# Patient Record
Sex: MALE | Race: WHITE | NOT HISPANIC OR LATINO | Employment: FULL TIME | URBAN - METROPOLITAN AREA
[De-identification: names, ages, dates, MRNs, and addresses within clinical notes are randomized per-mention and may not be internally consistent; named-entity substitution may affect disease eponyms.]

---

## 2021-09-01 ENCOUNTER — OFFICE VISIT (OUTPATIENT)
Dept: VASCULAR SURGERY | Facility: CLINIC | Age: 51
End: 2021-09-01
Payer: COMMERCIAL

## 2021-09-01 DIAGNOSIS — I83.893 SYMPTOMATIC VARICOSE VEINS OF BOTH LOWER EXTREMITIES: Primary | ICD-10-CM

## 2021-09-01 PROCEDURE — 99243 OFF/OP CNSLTJ NEW/EST LOW 30: CPT | Performed by: SPECIALIST

## 2021-09-01 NOTE — LETTER
9/1/2021         RE: Jin Voss  [address] [facility] [address]        Dear Colleague,    Thank you for referring your patient, Jin Voss, to the [facility] [place]. Please see a copy of my visit note below.    Consult requested by Dr. Martinez    Reason for consultation -bilateral lower extremity varicose veins    HPI:  Patient is a 50-year-old white male with a 4 to 5-year history of bilateral lower extremity varicose veins.  They appeared and then slowly worsened over that time.  He denies any right leg symptoms but occasionally report some intermittent left leg symptoms.  He has not had an ultrasound nor worn compression socks.  There is a history of trauma to his left leg while playing soccer in [place].  He denies any DVT, superficial phlebitis or nonhealing wounds.  He now presents for evaluation of his bilateral lower extremity varicose veins.    No past medical history on file.    No past surgical history on file.    No current outpatient medications on file.     No current facility-administered medications for this visit.        Allergies   Allergen Reactions     Penicillins Hives     PN: LW Reaction: Rash, Generalized         Social History     Socioeconomic History     Marital status:      Spouse name: Not on file     Number of children: Not on file     Years of education: Not on file     Highest education level: Not on file   Occupational History     Not on file   Tobacco Use     Smoking status: Not on file   Substance and Sexual Activity     Alcohol use: Not on file     Drug use: Not on file     Sexual activity: Not on file   Other Topics Concern     Not on file   Social History Narrative     Not on file     Social Determinants of Health     Financial Resource Strain:      Difficulty of Paying Living Expenses:    Food Insecurity:      Worried About Running Out of Food in the Last Year:      Ran Out of Food in the Last Year:    Transportation Needs:       Lack of Transportation (Medical):      Lack of Transportation (Non-Medical):    Physical Activity:      Days of Exercise per Week:      Minutes of Exercise per Session:    Stress:      Feeling of Stress :    Social Connections:      Frequency of Communication with Friends and Family:      Frequency of Social Gatherings with Friends and Family:      Attends Taoist Services:      Active Member of Clubs or Organizations:      Attends Club or Organization Meetings:      Marital Status:    Intimate Partner Violence:      Fear of Current or Ex-Partner:      Emotionally Abused:      Physically Abused:      Sexually Abused:      No family history on file.     ROS: 10 point ROS neg other than the symptoms noted above in the HPI.    PE:  B/P: Data Unavailable, T: Data Unavailable, P: Data Unavailable, R: Data Unavailable  General: well developed, well nourished thin WM who appears hia stated age  HEENT: NC/AT, EOMI, (-)icterus, (-)injection  Neck: Supple, No JVD  Chest: CTA  Heart: S1, S2, (-)m/r/g  Abd: Soft, non tender, non distended, non tender, no masses  Ext; Warm, no edema, space bilateral varicose veins left greater than right.  No stasis changes.  Psych: AAOx3  Neuro: No focal deficits      Impression/plan:  This 50 years old bilateral varicose veins.  The right leg is essentially asymptomatic and the left is minimally symptomatic.  He is a CEAP 2 bilaterally.  I did discuss him that his veins are mostly cosmetic from what he is describing.  He expressed understanding.  He would like to consider a cosmetic procedure as well and also know what is going on with his veins.  So after discussion the patient the plan at this time is to obtain an ultrasound for competency and start him in knee-high compression socks.  He will follow-up with me for video visit after his ultrasound.    Adrian Jorgensen MD, FACS      Again, thank you for allowing me to participate in the care of your patient.        Sincerely,        Adrian  MD [place]

## 2021-09-01 NOTE — PROGRESS NOTES
Consult requested by Dr. Martinez    Reason for consultation -bilateral lower extremity varicose veins    HPI:  Patient is a 50-year-old white male with a 4 to 5-year history of bilateral lower extremity varicose veins.  They appeared and then slowly worsened over that time.  He denies any right leg symptoms but occasionally report some intermittent left leg symptoms.  He has not had an ultrasound nor worn compression socks.  There is a history of trauma to his left leg while playing soccer in [place].  He denies any DVT, superficial phlebitis or nonhealing wounds.  He now presents for evaluation of his bilateral lower extremity varicose veins.    No past medical history on file.    No past surgical history on file.    No current outpatient medications on file.     No current facility-administered medications for this visit.        Allergies   Allergen Reactions     Penicillins Hives     PN: LW Reaction: Rash, Generalized         Social History     Socioeconomic History     Marital status:      Spouse name: Not on file     Number of children: Not on file     Years of education: Not on file     Highest education level: Not on file   Occupational History     Not on file   Tobacco Use     Smoking status: Not on file   Substance and Sexual Activity     Alcohol use: Not on file     Drug use: Not on file     Sexual activity: Not on file   Other Topics Concern     Not on file   Social History Narrative     Not on file     Social Determinants of Health     Financial Resource Strain:      Difficulty of Paying Living Expenses:    Food Insecurity:      Worried About Running Out of Food in the Last Year:      Ran Out of Food in the Last Year:    Transportation Needs:      Lack of Transportation (Medical):      Lack of Transportation (Non-Medical):    Physical Activity:      Days of Exercise per Week:      Minutes of Exercise per Session:    Stress:      Feeling of Stress :    Social Connections:      Frequency of  Communication with Friends and Family:      Frequency of Social Gatherings with Friends and Family:      Attends Pentecostal Services:      Active Member of Clubs or Organizations:      Attends Club or Organization Meetings:      Marital Status:    Intimate Partner Violence:      Fear of Current or Ex-Partner:      Emotionally Abused:      Physically Abused:      Sexually Abused:      No family history on file.     ROS: 10 point ROS neg other than the symptoms noted above in the HPI.    PE:  B/P: Data Unavailable, T: Data Unavailable, P: Data Unavailable, R: Data Unavailable  General: well developed, well nourished thin WM who appears hia stated age  HEENT: NC/AT, EOMI, (-)icterus, (-)injection  Neck: Supple, No JVD  Chest: CTA  Heart: S1, S2, (-)m/r/g  Abd: Soft, non tender, non distended, non tender, no masses  Ext; Warm, no edema, space bilateral varicose veins left greater than right.  No stasis changes.  Psych: AAOx3  Neuro: No focal deficits      Impression/plan:  This 50 years old bilateral varicose veins.  The right leg is essentially asymptomatic and the left is minimally symptomatic.  He is a CEAP 2 bilaterally.  I did discuss him that his veins are mostly cosmetic from what he is describing.  He expressed understanding.  He would like to consider a cosmetic procedure as well and also know what is going on with his veins.  So after discussion the patient the plan at this time is to obtain an ultrasound for competency and start him in knee-high compression socks.  He will follow-up with me for video visit after his ultrasound.    Adrian Jorgensen MD, FACS

## 2021-09-16 ENCOUNTER — ANCILLARY PROCEDURE (OUTPATIENT)
Dept: ULTRASOUND IMAGING | Facility: CLINIC | Age: 51
End: 2021-09-16
Attending: SPECIALIST
Payer: COMMERCIAL

## 2021-09-16 DIAGNOSIS — I83.893 SYMPTOMATIC VARICOSE VEINS OF BOTH LOWER EXTREMITIES: ICD-10-CM

## 2021-09-16 PROCEDURE — 93970 EXTREMITY STUDY: CPT | Performed by: SPECIALIST

## 2021-09-21 ENCOUNTER — VIRTUAL VISIT (OUTPATIENT)
Dept: VASCULAR SURGERY | Facility: CLINIC | Age: 51
End: 2021-09-21
Attending: SPECIALIST
Payer: COMMERCIAL

## 2021-09-21 DIAGNOSIS — I83.91 ASYMPTOMATIC VARICOSE VEINS OF RIGHT LOWER EXTREMITY: ICD-10-CM

## 2021-09-21 DIAGNOSIS — I83.812 VARICOSE VEINS OF LEFT LOWER EXTREMITY WITH PAIN: Primary | ICD-10-CM

## 2021-09-21 PROCEDURE — 99213 OFFICE O/P EST LOW 20 MIN: CPT | Mod: 95 | Performed by: SPECIALIST

## 2021-09-21 NOTE — LETTER
2021         RE: Jin Voss  [address] [facility] [address]        Dear Colleague,    Thank you for referring your patient, Jin Voss, to the [facility] [place]. Please see a copy of my visit note below.    Jin is a 51 year old who is being evaluated via a billable video visit.      How would you like to obtain your AVS? MyChart  If the video visit is dropped, the invitation should be resent by: Text to cell phone: [phone]  Will anyone else be joining your video visit? No    Video Start Time: 8:45 AM    F/U lower ext US       Subjective:  Patient is a 50-year-old white male with a 4 to 5-year history of bilateral lower extremity varicose veins.  They appeared and then slowly worsened over that time.  He denies any right leg symptoms but occasionally report some intermittent left leg symptoms.  He has not had an ultrasound nor worn compression socks.  There is a history of trauma to his left leg while playing soccer in [place].  He denies any DVT, superficial phlebitis or nonhealing wounds.      He had his US for which he now follows up.    Objective:  No vitals - video visit  Ext; Warm, no edema, space bilateral varicose veins left greater than right.  No stasis changes.    US -   Name:  Jin Voss                                                Patient ID: [id number]  Date: 2021                                         : 1970  Sex: male                                                                    Examined by: JANNETTE Hough RVT  Age:  51 year old                                                         Reading MD: HALIE Jorgensen     INDICATION:  Patient is referred for symptomatic varicose veins of bilateral lower extremities.      EXAM TYPE  BILATERAL LOWER EXTREMITY VENOUS DUPLEX FOR VENOUS INSUFFICIENCY  TECHNICAL SUMMARY     A duplex ultrasound study using color flow was performed, to evaluate the bilateral lower extremity veins for valvular  incompetence with the patient in a steep reversed trendelenberg.      RIGHT:     The deep veins demonstrate phasic flow, compress and respond to augmentations.  There is no DVT.  The common femoral and proximal femoral veins are incompetent and free of thrombus. The remaining deep veins are competent and free of thrombus.      The GSV demonstrates phasic flow, compresses and responds to augmentations from the saphenofemoral junction to the ankle with no evidence of thrombus. The great saphenous vein measures 3.3 mm at the saphenofemoral junction, 2.3 mm at the proximal thigh and 2.3 mm at the knee. The GSV is incompetent from Proximal Calf to Mid Calf, with the greatest reflux time of 2574 milliseconds.       The AASV is competent ( 2.9 mm) draining into the saphenofemoral junction.       The Giacomini vein is absent.     The SSV demonstrates phasic flow, compresses and responds to augmentations from the popliteal space to the ankle.  No thrombus is seen. The saphenopopliteal junction is competent  (3.0 mm). The SSV is incompetent at the Distal Calf with a reflux time of 528 milliseconds.       Perforators: there is no evidence of incompetent  veins at any level.         LEFT:     The deep veins demonstrate phasic flow, compress and respond to augmentations.  There is no DVT.  The common femoral, femoral, and popliteal veins are incompetent and free of thrombus.      The GSV demonstrates phasic flow, compresses and responds to augmentations from the saphenofemoral junction to the ankle with no evidence of thrombus. The great saphenous vein measures 5.5 mm at the saphenofemoral junction, 3.8 mm at the proximal thigh, and 3.5 mm at the knee. The GSV is incompetent from SFJ to Knee and again at the ankle, with the greatest reflux time of 6731 milliseconds.  The GSV gives rise to a varicose branch measuring 4.0 mm off the  Knee that courses Posteromedial with a reflux time of 5444 milliseconds.       The AASV  is competent ( 2.1 mm) draining into the saphenofemoral junction.       The Giacomini vein is absent.     The SSV demonstrates phasic flow, compresses and responds to augmentations from the popliteal space to the ankle.  No reflux or thrombus is seen. The saphenopopliteal junction is competent  ( 1.5 mm).     Perforators: there is no evidence of incompetent  veins at any level.         FINAL SUMMARY:  1.         No evidence of bilateral lower extremity deep vein thrombus.  2.         Right common femoral and proximal femoral vein incompetence.   3.         Left common femoral, femoral, and popliteal vein incompetence  4.         Right great saphenous vein incompetence.  5.         Right small saphenous vein incompetence.  6.         Left great saphenous vein incompetence.  7.         Left varicose vein incompetence.   8.         The time of incompetence is greater than 500 milliseconds in perforators and superficial veins and greater than 1000 milliseconds in deep veins.         Adrian Jorgensen MD, FACS      Assessment/plan:  This 50 years old bilateral varicose veins.  The right leg is essentially asymptomatic and the left is becoming more symptomatic.  He is a CEAP 2 bilaterally.  His right leg is asymptomatic but his left leg is noticed to becoming more symptomatic.  His ultrasound not reveal a any significant right lower extremity reflux but there is significant GSV reflux on the left.  I discussed these findings with the patient expressed understanding.  His left leg is becoming more symptomatic he feels.  After discussion with the patient plan at this time is to have him start his compression socks and follow-up with me in 3 months.  Should he be symptomatic despite compression therapy and left the plan be for a left GSV RF ablation with medically necessary stab phlebectomy.  He will follow-up with me in 3 months.      Adrian Jorgensen MD, FACS          {  Video-Visit Details    Type of service:   Video Visit    Video End Time:9:19 AM    Originating Location (pt. Location): Home    Distant Location (provider location):  [facility]     Platform used for Video Visit: [employer]      Again, thank you for allowing me to participate in the care of your patient.        Sincerely,        Adrian Jorgensen MD

## 2021-09-21 NOTE — PROGRESS NOTES
Jin is a 51 year old who is being evaluated via a billable video visit.      How would you like to obtain your AVS? MyChart  If the video visit is dropped, the invitation should be resent by: Text to cell phone: [phone]  Will anyone else be joining your video visit? No    Video Start Time: 8:45 AM    F/U lower ext US       Subjective:  Patient is a 50-year-old white male with a 4 to 5-year history of bilateral lower extremity varicose veins.  They appeared and then slowly worsened over that time.  He denies any right leg symptoms but occasionally report some intermittent left leg symptoms.  He has not had an ultrasound nor worn compression socks.  There is a history of trauma to his left leg while playing soccer in [place].  He denies any DVT, superficial phlebitis or nonhealing wounds.      He had his US for which he now follows up.    Objective:  No vitals - video visit  Ext; Warm, no edema, space bilateral varicose veins left greater than right.  No stasis changes.    US -   Name:  Jin Voss                                                Patient ID: [id number]  Date: 2021                                         : 1970  Sex: male                                                                    Examined by: JANNETTE Hough RVT  Age:  51 year old                                                         Reading MD: HALIE Jorgensen     INDICATION:  Patient is referred for symptomatic varicose veins of bilateral lower extremities.      EXAM TYPE  BILATERAL LOWER EXTREMITY VENOUS DUPLEX FOR VENOUS INSUFFICIENCY  TECHNICAL SUMMARY     A duplex ultrasound study using color flow was performed, to evaluate the bilateral lower extremity veins for valvular incompetence with the patient in a steep reversed trendelenberg.      RIGHT:     The deep veins demonstrate phasic flow, compress and respond to augmentations.  There is no DVT.  The common femoral and proximal femoral veins are incompetent and free of  thrombus. The remaining deep veins are competent and free of thrombus.      The GSV demonstrates phasic flow, compresses and responds to augmentations from the saphenofemoral junction to the ankle with no evidence of thrombus. The great saphenous vein measures 3.3 mm at the saphenofemoral junction, 2.3 mm at the proximal thigh and 2.3 mm at the knee. The GSV is incompetent from Proximal Calf to Mid Calf, with the greatest reflux time of 2574 milliseconds.       The AASV is competent ( 2.9 mm) draining into the saphenofemoral junction.       The Giacomini vein is absent.     The SSV demonstrates phasic flow, compresses and responds to augmentations from the popliteal space to the ankle.  No thrombus is seen. The saphenopopliteal junction is competent  (3.0 mm). The SSV is incompetent at the Distal Calf with a reflux time of 528 milliseconds.       Perforators: there is no evidence of incompetent  veins at any level.         LEFT:     The deep veins demonstrate phasic flow, compress and respond to augmentations.  There is no DVT.  The common femoral, femoral, and popliteal veins are incompetent and free of thrombus.      The GSV demonstrates phasic flow, compresses and responds to augmentations from the saphenofemoral junction to the ankle with no evidence of thrombus. The great saphenous vein measures 5.5 mm at the saphenofemoral junction, 3.8 mm at the proximal thigh, and 3.5 mm at the knee. The GSV is incompetent from SFJ to Knee and again at the ankle, with the greatest reflux time of 6731 milliseconds.  The GSV gives rise to a varicose branch measuring 4.0 mm off the  Knee that courses Posteromedial with a reflux time of 5444 milliseconds.       The AASV is competent ( 2.1 mm) draining into the saphenofemoral junction.       The Giacomini vein is absent.     The SSV demonstrates phasic flow, compresses and responds to augmentations from the popliteal space to the ankle.  No reflux or thrombus is seen.  The saphenopopliteal junction is competent  ( 1.5 mm).     Perforators: there is no evidence of incompetent  veins at any level.         FINAL SUMMARY:  1.         No evidence of bilateral lower extremity deep vein thrombus.  2.         Right common femoral and proximal femoral vein incompetence.   3.         Left common femoral, femoral, and popliteal vein incompetence  4.         Right great saphenous vein incompetence.  5.         Right small saphenous vein incompetence.  6.         Left great saphenous vein incompetence.  7.         Left varicose vein incompetence.   8.         The time of incompetence is greater than 500 milliseconds in perforators and superficial veins and greater than 1000 milliseconds in deep veins.         Adrian Jorgensen MD, FACS      Assessment/plan:  This 50 years old bilateral varicose veins.  The right leg is essentially asymptomatic and the left is becoming more symptomatic.  He is a CEAP 2 bilaterally.  His right leg is asymptomatic but his left leg is noticed to becoming more symptomatic.  His ultrasound not reveal a any significant right lower extremity reflux but there is significant GSV reflux on the left.  I discussed these findings with the patient expressed understanding.  His left leg is becoming more symptomatic he feels.  After discussion with the patient plan at this time is to have him start his compression socks and follow-up with me in 3 months.  Should he be symptomatic despite compression therapy and left the plan be for a left GSV RF ablation with medically necessary stab phlebectomy.  He will follow-up with me in 3 months.      Adrian Jorgensen MD, FACS          {  Video-Visit Details    Type of service:  Video Visit    Video End Time:9:19 AM    Originating Location (pt. Location): Home    Distant Location (provider location):  [facility] CL[facility] [place]     Platform used for Video Visit: SQLstream

## 2021-12-22 ENCOUNTER — VIRTUAL VISIT (OUTPATIENT)
Dept: VASCULAR SURGERY | Facility: CLINIC | Age: 51
End: 2021-12-22
Payer: COMMERCIAL

## 2021-12-22 DIAGNOSIS — I83.812 VARICOSE VEINS OF LEFT LOWER EXTREMITY WITH PAIN: Primary | ICD-10-CM

## 2021-12-22 DIAGNOSIS — I83.91 ASYMPTOMATIC VARICOSE VEINS OF RIGHT LOWER EXTREMITY: ICD-10-CM

## 2021-12-22 PROCEDURE — 99213 OFFICE O/P EST LOW 20 MIN: CPT | Mod: 95 | Performed by: SPECIALIST

## 2021-12-22 NOTE — PROGRESS NOTES
Jin is a 51 year old who is being evaluated via a billable video visit.      How would you like to obtain your AVS? MyChart  If the video visit is dropped, the invitation should be resent by: Text to cell phone: [phone]  Will anyone else be joining your video visit? No    Video Start Time: 3:26 PM    Video-Visit Details    F/U of compression therapy.    Subjective:  Patient is a 51-year-old white male with a 4 to 5-year history of bilateral lower extremity varicose veins.  They appeared and then slowly worsened over that time.  He denies any right leg symptoms but occasionally report some intermittent left leg symptoms.  He has not had an ultrasound nor worn compression socks.  There is a history of trauma to his left leg while playing soccer in [place].  He denies any DVT, superficial phlebitis or nonhealing wounds.      He has been wearing compression socks since he was last seen in September.  His left leg continues to be symptomatic despite compression therapy.      Objective:  No vitals - video visit  Ext; Warm, no edema, space bilateral varicose veins left greater than right.  No stasis changes.    US -   Name:  Jin Voss                                                Patient ID: [id number]  Date: 2021                                         : 1970  Sex: male                                                                    Examined by: JANNETTE Hough RVT  Age:  51 year old                                                         Reading MD: HALIE Jorgensen     INDICATION:  Patient is referred for symptomatic varicose veins of bilateral lower extremities.      EXAM TYPE  BILATERAL LOWER EXTREMITY VENOUS DUPLEX FOR VENOUS INSUFFICIENCY  TECHNICAL SUMMARY     A duplex ultrasound study using color flow was performed, to evaluate the bilateral lower extremity veins for valvular incompetence with the patient in a steep reversed trendelenberg.      RIGHT:     The deep veins demonstrate phasic  flow, compress and respond to augmentations.  There is no DVT.  The common femoral and proximal femoral veins are incompetent and free of thrombus. The remaining deep veins are competent and free of thrombus.      The GSV demonstrates phasic flow, compresses and responds to augmentations from the saphenofemoral junction to the ankle with no evidence of thrombus. The great saphenous vein measures 3.3 mm at the saphenofemoral junction, 2.3 mm at the proximal thigh and 2.3 mm at the knee. The GSV is incompetent from Proximal Calf to Mid Calf, with the greatest reflux time of 2574 milliseconds.       The AASV is competent ( 2.9 mm) draining into the saphenofemoral junction.       The Giacomini vein is absent.     The SSV demonstrates phasic flow, compresses and responds to augmentations from the popliteal space to the ankle.  No thrombus is seen. The saphenopopliteal junction is competent  (3.0 mm). The SSV is incompetent at the Distal Calf with a reflux time of 528 milliseconds.       Perforators: there is no evidence of incompetent  veins at any level.         LEFT:     The deep veins demonstrate phasic flow, compress and respond to augmentations.  There is no DVT.  The common femoral, femoral, and popliteal veins are incompetent and free of thrombus.      The GSV demonstrates phasic flow, compresses and responds to augmentations from the saphenofemoral junction to the ankle with no evidence of thrombus. The great saphenous vein measures 5.5 mm at the saphenofemoral junction, 3.8 mm at the proximal thigh, and 3.5 mm at the knee. The GSV is incompetent from SFJ to Knee and again at the ankle, with the greatest reflux time of 6731 milliseconds.  The GSV gives rise to a varicose branch measuring 4.0 mm off the  Knee that courses Posteromedial with a reflux time of 5444 milliseconds.       The AASV is competent ( 2.1 mm) draining into the saphenofemoral junction.       The Giacomini vein is absent.     The SSV  demonstrates phasic flow, compresses and responds to augmentations from the popliteal space to the ankle.  No reflux or thrombus is seen. The saphenopopliteal junction is competent  ( 1.5 mm).     Perforators: there is no evidence of incompetent  veins at any level.         FINAL SUMMARY:  1.         No evidence of bilateral lower extremity deep vein thrombus.  2.         Right common femoral and proximal femoral vein incompetence.   3.         Left common femoral, femoral, and popliteal vein incompetence  4.         Right great saphenous vein incompetence.  5.         Right small saphenous vein incompetence.  6.         Left great saphenous vein incompetence.  7.         Left varicose vein incompetence.   8.         The time of incompetence is greater than 500 milliseconds in perforators and superficial veins and greater than 1000 milliseconds in deep veins.         Adrian Jorgensen MD, FACS      Assessment/plan:  This 51 years old bilateral varicose veins.  The right leg is essentially asymptomatic and the left is symptomatic despite compression therapy.  He is a CEAP 2 bilaterally.  His right leg is asymptomatic   His ultrasound not reveal a any significant right lower extremity reflux but there is significant GSV reflux on the left.  As he is symptomatic despite compression therapy is a candidate for a left GSV RF ablation with medically necessary stab phlebectomies.   The procedure, risks, benefits, and alternatives were discussed and the patient agrees to proceed.  He will be scheduled once insurance approves obtained.    Adrian Jorgensen MD, FACS              Video End Time:3:43 PM    Originating Location (pt. Location): Home    Distant Location (provider location):  [facility]     Platform used for Video Visit: [id number]

## 2021-12-22 NOTE — LETTER
2021         RE: Jin Voss  [address] [address]        Dear Colleague,    Thank you for referring your patient, Jin Voss, to the [facility] CLINIC [place]. Please see a copy of my visit note below.    Jin is a 51 year old who is being evaluated via a billable video visit.      How would you like to obtain your AVS? MyChart  If the video visit is dropped, the invitation should be resent by: Text to cell phone: [phone]  Will anyone else be joining your video visit? No    Video Start Time: 3:26 PM    Video-Visit Details    F/U of compression therapy.    Subjective:  Patient is a 51-year-old white male with a 4 to 5-year history of bilateral lower extremity varicose veins.  They appeared and then slowly worsened over that time.  He denies any right leg symptoms but occasionally report some intermittent left leg symptoms.  He has not had an ultrasound nor worn compression socks.  There is a history of trauma to his left leg while playing soccer in [place].  He denies any DVT, superficial phlebitis or nonhealing wounds.      He has been wearing compression socks since he was last seen in September.  His left leg continues to be symptomatic despite compression therapy.      Objective:  No vitals - video visit  Ext; Warm, no edema, space bilateral varicose veins left greater than right.  No stasis changes.    US -   Name:  Jin Voss                                                Patient ID: [id number]  Date: 2021                                         : 1970  Sex: male                                                                    Examined by: JANNETTE Hough RVT  Age:  51 year old                                                         Reading MD: HALIE Jorgensen     INDICATION:  Patient is referred for symptomatic varicose veins of bilateral lower extremities.      EXAM TYPE  BILATERAL LOWER EXTREMITY VENOUS DUPLEX FOR VENOUS  INSUFFICIENCY  TECHNICAL SUMMARY     A duplex ultrasound study using color flow was performed, to evaluate the bilateral lower extremity veins for valvular incompetence with the patient in a steep reversed trendelenberg.      RIGHT:     The deep veins demonstrate phasic flow, compress and respond to augmentations.  There is no DVT.  The common femoral and proximal femoral veins are incompetent and free of thrombus. The remaining deep veins are competent and free of thrombus.      The GSV demonstrates phasic flow, compresses and responds to augmentations from the saphenofemoral junction to the ankle with no evidence of thrombus. The great saphenous vein measures 3.3 mm at the saphenofemoral junction, 2.3 mm at the proximal thigh and 2.3 mm at the knee. The GSV is incompetent from Proximal Calf to Mid Calf, with the greatest reflux time of 2574 milliseconds.       The AASV is competent ( 2.9 mm) draining into the saphenofemoral junction.       The Giacomini vein is absent.     The SSV demonstrates phasic flow, compresses and responds to augmentations from the popliteal space to the ankle.  No thrombus is seen. The saphenopopliteal junction is competent  (3.0 mm). The SSV is incompetent at the Distal Calf with a reflux time of 528 milliseconds.       Perforators: there is no evidence of incompetent  veins at any level.         LEFT:     The deep veins demonstrate phasic flow, compress and respond to augmentations.  There is no DVT.  The common femoral, femoral, and popliteal veins are incompetent and free of thrombus.      The GSV demonstrates phasic flow, compresses and responds to augmentations from the saphenofemoral junction to the ankle with no evidence of thrombus. The great saphenous vein measures 5.5 mm at the saphenofemoral junction, 3.8 mm at the proximal thigh, and 3.5 mm at the knee. The GSV is incompetent from SFJ to Knee and again at the ankle, with the greatest reflux time of 6731 milliseconds.   The GSV gives rise to a varicose branch measuring 4.0 mm off the  Knee that courses Posteromedial with a reflux time of 5444 milliseconds.       The AASV is competent ( 2.1 mm) draining into the saphenofemoral junction.       The Giacomini vein is absent.     The SSV demonstrates phasic flow, compresses and responds to augmentations from the popliteal space to the ankle.  No reflux or thrombus is seen. The saphenopopliteal junction is competent  ( 1.5 mm).     Perforators: there is no evidence of incompetent  veins at any level.         FINAL SUMMARY:  1.         No evidence of bilateral lower extremity deep vein thrombus.  2.         Right common femoral and proximal femoral vein incompetence.   3.         Left common femoral, femoral, and popliteal vein incompetence  4.         Right great saphenous vein incompetence.  5.         Right small saphenous vein incompetence.  6.         Left great saphenous vein incompetence.  7.         Left varicose vein incompetence.   8.         The time of incompetence is greater than 500 milliseconds in perforators and superficial veins and greater than 1000 milliseconds in deep veins.         Adrian Jorgensen MD, FACS      Assessment/plan:  This 51 years old bilateral varicose veins.  The right leg is essentially asymptomatic and the left is symptomatic despite compression therapy.  He is a CEAP 2 bilaterally.  His right leg is asymptomatic   His ultrasound not reveal a any significant right lower extremity reflux but there is significant GSV reflux on the left.  As he is symptomatic despite compression therapy is a candidate for a left GSV RF ablation with medically necessary stab phlebectomies.   The procedure, risks, benefits, and alternatives were discussed and the patient agrees to proceed.  He will be scheduled once insurance approves obtained.    Adrian Jorgensen MD, FACS              Video End Time:3:43 PM    Originating Location (pt. Location):  Home    Distant Location (provider location):  [facility] CLIN[facility] [place]     Platform used for Video Visit: [employer]      Again, thank you for allowing me to participate in the care of your patient.        Sincerely,        Adrian Jorgensen MD

## 2022-02-09 DIAGNOSIS — I83.812 VARICOSE VEINS OF LEFT LOWER EXTREMITY WITH PAIN: Primary | ICD-10-CM

## 2022-02-17 ENCOUNTER — TELEPHONE (OUTPATIENT)
Dept: VASCULAR SURGERY | Facility: CLINIC | Age: 52
End: 2022-02-17
Payer: COMMERCIAL

## 2022-02-17 DIAGNOSIS — I83.812 VARICOSE VEINS OF LEFT LOWER EXTREMITY WITH PAIN: Primary | ICD-10-CM

## 2022-02-17 NOTE — TELEPHONE ENCOUNTER
Vein Clinic Preoperative Nurse Call    Procedure: Left leg VNUS closure GSV(med nec), 20-30 stab phlebs(med nec)  Date: 2/23/2022  Surgeon: Adrian Jorgensen MD  Time: 1300  Check in time: 1200    Called and left detailed message. Informed patient: when to check in (1200) to sign consent, to bring their preop medications in their original bottle with them (3mg ativan, 0.1mg clonidine). Patient will take the medications after signing the consent to the procedure. Instructed patient to wear a mask, wear loose-fitting comfortable clothing, and bring their compression hose. Ensured patient has a /someone that will be responsible for them the rest of the day. Visitors are allowed in the clinic, but they would need to stay in an exam room or wait in the parking lot or leave. If  does leave, IF POSSIBLE, we ask that they do not go more than 15-20 mins from our clinic. Once procedure is completed, we will keep patient in recovery for 30-45 mins, and call  with aftercare instructions. Informed patient, that if possible, they should sit in the backseat to elevate their leg on the ride home.    Pt needs thigh-high compression hose for procedure. Status of the hose: patient should call clinic back with status of compression hose.    Special instructions: none     Special COVID-19 instructions: Patient aware they need to wear a mask to our clinic and during their procedure. Patient aware that their  can come in with them, but would need to stay in an exam room during the procedure or wait in the parking lot or leave. Nurse will call pt's  when procedure is over.    Patient understands that if they have any of the following symptoms (fever, cough, shortness of breath, rash), they need to notify us immediately to cancel their procedure and will have to reschedule for a later date.    Patient is in agreement with preoperative information and has no further questions.    Matt Valdivia,  RN  2/17/2022

## 2022-02-18 RX ORDER — CLONIDINE HYDROCHLORIDE 0.1 MG/1
TABLET ORAL
Qty: 1 TABLET | Refills: 0 | Status: SHIPPED | OUTPATIENT
Start: 2022-02-18

## 2022-02-18 RX ORDER — LORAZEPAM 1 MG/1
TABLET ORAL
Qty: 1 TABLET | Refills: 0 | Status: SHIPPED | OUTPATIENT
Start: 2022-02-18

## 2022-02-18 NOTE — TELEPHONE ENCOUNTER
"Spoke with patient in regards to preop information that was left via voicemail yesterday. Patient reports not calling his insurance regarding compression hose. \"I will either purchase them from your clinic or pick some up at a local place.\" Patient also mentioned he has a flight that he needs to be on March 6th, which is not quite 2 weeks after procedure. If he is unable to fly, he would like to reschedule his procedure. Lastly, I mentioned and wanted to verify that patient was aware that Dr. Jorgensen is considered out of network per insurance. Patient stated he had been in communication with insurance and [facility] Scheduler and that in-fact his insurance will cover the procedure. \"I sent a sheet of paper, that the insurance company gave me, to the gal I have been in contact with showing that my insurance will pay.\" Towards the end of conversation, I stated I would check on compression size to make sure the clinic does have what he needs. I would also speak with Dr. Jorgensen to verify if patient can fly on the 6th of March. Lastly, I would speak with Sal[facility] MARIA to verify if she received the information patient sent over regarding  insurance. I will call patient back in regards to all information we spoke about. Patient states hes on vacation, so to leave a message if he doesn't answer and he will call back.   "

## 2022-02-21 NOTE — TELEPHONE ENCOUNTER
Left voice message to call back in regards to follow up information we had talked about last Friday 2/18/22.

## 2022-02-22 NOTE — TELEPHONE ENCOUNTER
Called pt back to let him know that I noticed only 1 tablet (1mg) of ativan was sent to the pt's pharmacy when we usually send 3 tablets (3mg) for pt's age, per our protocol. Instructed pt to call us if he'd like to  another 2 tablets, otherwise informed pt he will be just fine with the 1mg tablet ativan and the 0.1mg clonidine.    Gave our call back number if pt has any questions or concerns.    Dede Moseley RN  [facility]  Vein Clinic

## 2022-02-22 NOTE — TELEPHONE ENCOUNTER
Called pt to confirm his procedure for tomorrow. Spoke to pt for about 15 minutes confirming all pertinent preop information. Pt stated he would like to get his compression hose through the [facility] store so it'll go through his insurance, as pt stated his insurance will cover them.    Reached out to [employer] to see if they have 1 pair of thigh high, black, open-toe stockings. Will  from [facility] and bring to [place] tomorrow (Wed 2/23) for pt's procedure.    Pt is in agreement with plan and had no further questions at this time.    Dede Moseley RN  [facility]  Vein Clinic

## 2023-03-23 NOTE — TELEPHONE ENCOUNTER
Left message, including phone number, around 1030 for patient to call clinic back in regards to information we recently talked about.     Strong peripheral pulses

## 2023-05-20 ENCOUNTER — HEALTH MAINTENANCE LETTER (OUTPATIENT)
Age: 53
End: 2023-05-20

## 2024-07-27 ENCOUNTER — HEALTH MAINTENANCE LETTER (OUTPATIENT)
Age: 54
End: 2024-07-27